# Patient Record
Sex: MALE | Race: WHITE | URBAN - METROPOLITAN AREA
[De-identification: names, ages, dates, MRNs, and addresses within clinical notes are randomized per-mention and may not be internally consistent; named-entity substitution may affect disease eponyms.]

---

## 2020-03-09 ENCOUNTER — OFFICE VISIT (OUTPATIENT)
Dept: FAMILY MEDICINE CLINIC | Age: 35
End: 2020-03-09

## 2020-03-09 VITALS
HEIGHT: 71 IN | WEIGHT: 315 LBS | DIASTOLIC BLOOD PRESSURE: 84 MMHG | HEART RATE: 98 BPM | RESPIRATION RATE: 16 BRPM | BODY MASS INDEX: 44.1 KG/M2 | TEMPERATURE: 98 F | SYSTOLIC BLOOD PRESSURE: 133 MMHG | OXYGEN SATURATION: 98 %

## 2020-03-09 DIAGNOSIS — D68.00 VON WILLEBRAND DISEASE: Primary | ICD-10-CM

## 2020-03-09 DIAGNOSIS — E66.01 OBESITY, MORBID (HCC): ICD-10-CM

## 2020-03-09 DIAGNOSIS — G47.33 OBSTRUCTIVE SLEEP APNEA SYNDROME: ICD-10-CM

## 2020-03-09 NOTE — PROGRESS NOTES
Patient stated name &     Chief Complaint   Patient presents with    Complete Physical     New patient to [facility] Due   Topic    DTaP/Tdap/Td series (1 - Tdap)    Influenza Age 5 to Adult        Wt Readings from Last 3 Encounters:   20 334 lb 9.6 oz (151.8 kg)     Temp Readings from Last 3 Encounters:   20 98 °F (36.7 °C) (Oral)     BP Readings from Last 3 Encounters:   20 133/84     Pulse Readings from Last 3 Encounters:   20 98         Learning Assessment:  :     No flowsheet data found. Depression Screening:  :     3 most recent PHQ Screens 3/9/2020   Little interest or pleasure in doing things Not at all   Feeling down, depressed, irritable, or hopeless Not at all   Total Score PHQ 2 0       Fall Risk Assessment:  :     No flowsheet data found. Abuse Screening:  :     No flowsheet data found. Coordination of Care Questionnaire:  :     1) Have you been to an emergency room, urgent care clinic since your last visit? No    Hospitalized since your last visit? No             2) Have you seen or consulted any other health care providers outside of [address] [place] [address] since your last visit? No  (Include any pap smears or colon screenings in this section.)    Patient is accompanied by self I have received verbal consent from Nicki Charles to discuss any/all medical information while they are present in the room.

## 2020-03-09 NOTE — PATIENT INSTRUCTIONS
STOP the SUGAR The first step in dietary efforts at weight loss is removing as much sugar from the diet as possible. Most dietary sugar is in the forms of table sugar (sucrose), fruit sugar (fructose) and milk sugar (lactose). But, as the picture above demonstrates, you need to watch labels to see if processed foods have added sugars under other names. To eliminate sucrose, eliminate sweet drinks entirely including soft drinks, sports drinks, lemonade, sweet tea and wine. Also, eliminate candy and make desserts a \"special occasion only treat\". Don't eat desserts with every meal or every night. Most fructose is found in fruit and this should be markedly limited. Fruit juice should be avoided. One piece of fruit daily is the limit. Berries are a good choice to eat as a garnish for other foods. Bananas and grapes should be avoided. Avoid high fructose corn syrup (an artificial sweetener). Milk sugar, or lactose, should be avoided as well. Milk is a good source of calcium but not for people struggling with weight issues. Put a little milk in your coffee or tea but otherwise avoid milk. How can you avoid sugar? For starters, don't buy it and don't bring it in the house. It won't tempt you that way! For more information: 
 
Try the internet for videos about sugar addiction or try diet doctor.com. Carb Counting in Diabetes Carbohydrate or carb counting is a method of calculating grams of carbohydrate consumed at meals and snacks. Foods that contain carbohydrate have the greatest effect on blood sugar compared to foods that contain protein or fat. A low carb diet has the greatest likelihood of promoting weight loss. What Foods Have Carbohydrate? Foods that contain carbohydrate or carbs are: 
 
-grains like wheat, rice, oatmeal, and barley 
-grain-based foods like bread, cereal, pasta, and crackers 
-starchy vegetables like potatoes, peas and corn 
-fruit and juice -milk and yogurt 
-dried beans and peas and soy products like veggie burgers 
-sweets and snack foods like sodas, juice drinks, cake, cookies, candy, and chips Non-starchy vegetables like lettuce, cucumbers, broccoli, and cauliflower have very little carbohydrate and minimal impact on your blood glucose. Carbohydrate Servings 
 
-In meal planning, 1 serving of a food with carbohydrate has about 15 grams of carbohydrate: 
-Check serving sizes with measuring cups and spoons or a food scale. -Read the Nutrition Facts on food labels to find out how many grams of carbohydrate are in foods you eat.  
-1 carb serving (15 grams) is roughly equal to one piece of bread How much carbohydrate should I eat? Diabetics are advised to eat: For women-30-45 grams or 2-3 carb servings per meal. 
For men-45-60 grams or 3-4 carb servings per meal. 
 
For weight loss, patients should try to eat under 100 grams of carbs per day. How do I \"manage\" carbs? 
 
-First, eliminate sugar in the form of soft drinks, candy and desserts. Minimize cakes, cookies, smoothies and juices. 
-Next, identify the carbs you are eating. Watch labels and know when you are eating a starch. Eat less bread, noodles, pasta, rice, potatoes, french fries, cereals, chips, crackers and yogurt 
-Eat more healthy proteins and fats with more eggs, full fat dairy products, non starchy vegetables, salads, meat, poultry, fish, cheese, berries, nuts, olive oil and butter. 
-Avoid beer. Europeans refer to beer as \"liquid bread\" and it is made from grains with a high carb content. Where can I find more information? There is a lot of information about carb counting on the internet and there are books about carb counting. Try the American Diabetes Association and Dietdoctor. com So, what can I eat? 1) Protein-protein consumption promotes weight loss.   Eat protein at every meal.  Good sources of protein include meat, fish, poultry and eggs. 2) More soluble fiber-soluble fiber helps us feel \"full\" and helps improve many markers for cardiovascular disease. But, we have to watch out for products with added sugar or large carb servings! Sources of soluble fiber include oatmeal, root vegetables such as sweet potatoes, turnips and carrots, vegetables such as broccoli and Brussel sprouts. 3) Healthy fats and oils-certain fats are better for our blood vessels and cardiovascular system. The oils in fish and seafood tend to be better for us as well as olive oil and the nuts on trees (walnuts, almonds, pistachios and hazelnuts). So, again, try to eat more fish. Use olive oil for cooking and for salad dressings. Nuts can be used in salads and in other dishes and they make a good low carb snack. 4) Non starchy vegetables-Green and yellow vegetables offer a lot of nutrients and very low amounts of carbs that can lead to weight gain. Salads can add a lot to our diet and also pack in a lot of nutrients. Cautions:  avoid starchy vegetables such as potatoes, corn and peas. Also, be careful about what is added to vegetables such as fruit or salad dressings that contain sugar. 5) Full fat dairy products-Cheeses make a good snack or appetizer. Cottage cheese can be a good basis for breakfast.  Yogurt is a good addition to our diet but watch out for yogurt that has added sugar. Avoid milk. EXERCISE AND WEIGHT LOSS You'll hear lots of different things about weight loss and exercise. There is controversy about how much exercise helps with weight loss. We'll review what you should do about exercise and a weight plan here. First, it is hard to lose weight just with exercise. It takes about 3500 extra calories burned to lose a single pound.   To put exercise in perspective, most people burn about 150 calories per mile if they walk or run. Doing the math, it takes over 23 miles to burn up the energy to lose one pound. So if you want to lose weight, exercise by itself is unlikely to help with weight loss very much. You need to work on diet and exercise at the same time to lose weight. And, you need to work on your habits and emotions since they have huge impacts on eating behaviors. But, exercise does help with weight loss. If you exercise, you burn stored fat in your muscles and that allows the levels of insulin in your body to fall. This allows the enzyme that burns fat to \"switch on\" and use stored fat for energy. That combined with a no sugar, lower starch diet combines to promote weight loss. Think of it this way:  exercise permits weight loss! Most people that lose weight and keep it off exercise. What's the right exercise? The best exercise is the one you enjoy and can keep doing! Exercise that makes you feel good physically and makes you feel good about yourself is ideal.   
 
Exercise that elevates your heart rate for a sustained period such as running, biking,  walking and swimming improves your cardiovascular fitness. Resistance exercises such as weight lifting, strength training or calisthenics also clearly improve cardiovascular health and weight control. Stretching and yoga help with flexibility and balance. Ideally, an exercise program should include all of these different types of exercise. How much should I exercise? For weight loss, you should aim to exercise 45 minutes per day, 5-6 days per week. When you exercise 45 minutes, you exhaust the supply of fat your muscles store for energy and you help you body turn on the enzyme that burns stored fat elsewhere. This is the minimum amount of exercise you should shoot for. Remember, you don't need to think of exercise as strictly an organized period of time where that's all you do.   You can increase your exercise in all your daily activities. Walk more at work or school. If you can complete an errand by walking instead of driving, do it. Park farther away from the store if you go shopping and force yourself to walk farther. On breaks at work, walk around the office and greet your coworkers instead of sitting at your desk. Jose Catherineorn a few calories and enjoy the company of others. Go for a walk around the sports field while the kids practice sports. Take another parent with you. What are other benefits of exercise? While exercise helps you lose weight, it is helping you in lots of other ways. Exercise lowers your risk of diabetes and high blood pressure and makes your heart healthier. It lowers your risk of heart attacks. Exercise can help chronic lung disease and congestive heart failure improve. Exercise lowers the risk of dementia including Alzheimer's disease. Exercise lowers the risk of some cancers and decreases the risk of osteoporosis. And interestingly, exercise helps with emotionally health and lowers the risk and severity of emotional illnesses such as depression. Said simply, exercise helps you live longer and better. What will help me keep up the exercise? Remember that exercise is your gift to yourself and your family. So schedule time for your exercise and be selfish about guarding that time. It's yours! Exercise with a friend or friends and make exercise a social activity that you look forward to. Friends keep each other honest and accountable. Think of how you feel when you exercise. Most people just feel better physically and emotionally. Remember that feeling and try to recapture it. Remember exercise will help you live longer and better and will help you be there for your children and grandchildren. Make that commitment for your family.  
 
And don't forget to tell yourself that while you feel better you look better! Bouchra Elmore said it:  \"You look marvelous! \" LOSE WEIGHT WHILE YOU SLEEP Fasting Fasting is part of a weight loss program.  Really?!? Yes. Fasting has been part of the human condition forever. In our modern society, many of us rarely miss a meal but our ancestors often faced prolonged periods of food scarcity so our bodies store calories as fat for that possibility. Fasting, even for a short period, helps us lose weight by burning stored sugar and fat in our liver and \"switching on\" the enzymes that help us burn stored fat. How often should I fast? 
 
You should fast every night! It's important to give our systems a rest from eating and we should fast every night between our evening meal and breakfast.  You should try to have at least 12 hours every night where you aren't eating at all. Longer fasts You can consider longer periods of fasting to lose weight but first a few cautions. Make sure you stay well hydrated. Drink plenty of water. If you take medications for weight loss or diabetes, discuss fasting with your doctor first.     
 
Sleep You can lose weight while you sleep! It makes sense if you think about it. When you sleep, the body's machinery is still running and you aren't taking in any additional calories. And, research shows that good sleeping habits promote weight loss. Sleep too little and weight loss is more difficult. Ideally for weight loss, you should sleep 7-8 hours each night. Interestingly, if you sleep at a cooler temperature, you lose more weight. You body burns more calories to stay warm.

## 2020-03-09 NOTE — PROGRESS NOTES
Assessment and Plan    1. Obesity, morbid ([facility] [address]  Diet as discussed. No sugar, low starch with 12 hours overnight fast, 45 min of exercise daily  - CBC WITH AUTOMATED DIFF; Future  - HEMOGLOBIN A1C WITH EAG; Future  - METABOLIC PANEL, BASIC; Future  - TSH 3RD GENERATION; Future    2. Von Willebrand disease ([facility] [address]  To Dr. Laura Cooper    3. Obstructive sleep apnea syndrome  For sleep study with Dr. Remedios Laughlin    4. Tennis elbow, left   Ice and splint as discussed. Follow-up and Dispositions    · Return in about 1 month (around 4/9/2020). Diagnosis and plan discussed with patient who verbillized understanding. History of present Ramy Maria is a 30 y.o. male presenting for Complete Physical (New patient to us)    Girlfriend and mom think he has sleep apnea  Snores  No energy problems during the day. Neck is a size 20-21  BMI 46  Nondrinker    ? Michelle Scruggs  Previously told he had von Willdebrand's disease and hemophilia. Wants referral to heme onc. Review of Systems   Musculoskeletal: Positive for joint pain (left elbow). Past Medical History:   Diagnosis Date    Clotting disorder [facility])      History reviewed. No pertinent surgical history.   Family History   Problem Relation Age of Onset    Elevated Lipids Mother     Hypertension Mother    [facility] Elevated Lipids Father     Heart Disease Father      Social History     Socioeconomic History    Marital status: SINGLE     Spouse name: Not on file    Number of children: Not on file    Years of education: Not on file    Highest education level: Not on file   Occupational History    Not on file   Social Needs    Financial resource strain: Not on file    Food insecurity:     Worry: Not on file     Inability: Not on file    Transportation needs:     Medical: Not on file     Non-medical: Not on file   Tobacco Use    Smoking status: Never Smoker    Smokeless tobacco: Never Used Substance and Sexual Activity    Alcohol use: Not Currently     Frequency: Never    Drug use: Never    Sexual activity: Not Currently   Lifestyle    Physical activity:     Days per week: Not on file     Minutes per session: Not on file    Stress: Not on file   Relationships    Social connections:     Talks on phone: Not on file     Gets together: Not on file     Attends Yarsani service: Not on file     Active member of club or organization: Not on file     Attends meetings of clubs or organizations: Not on file     Relationship status: Not on file    Intimate partner violence:     Fear of current or ex partner: Not on file     Emotionally abused: Not on file     Physically abused: Not on file     Forced sexual activity: Not on file   Other Topics Concern    Not on file   Social History Narrative    Not on file         Prior to Admission medications    Not on File        Allergies   Allergen Reactions    Gluten Diarrhea     Also, intestinal cramps    Morphine Itching       Vitals:    03/09/20 1424   BP: 133/84   Pulse: 98   Resp: 16   Temp: 98 °F (36.7 °C)   TempSrc: Oral   SpO2: 98%   Weight: 334 lb 9.6 oz (151.8 kg)   Height: 5' 11\" (1.803 m)     Body mass index is 46.67 kg/m². Objective  Physical Exam  Vitals signs and nursing note reviewed. Constitutional:       Appearance: Normal appearance. He is not toxic-appearing. HENT:      Head: Normocephalic and atraumatic. Neck:      Musculoskeletal: No muscular tenderness. Cardiovascular:      Rate and Rhythm: Normal rate and regular rhythm. Heart sounds: Normal heart sounds. No murmur. No gallop. Pulmonary:      Effort: Pulmonary effort is normal. No respiratory distress. Breath sounds: Normal breath sounds. No wheezing, rhonchi or rales. Lymphadenopathy:      Cervical: No cervical adenopathy. Neurological:      Mental Status: He is alert.    Psychiatric:         Mood and Affect: Mood normal.         Behavior: Behavior normal.         Thought Content:  Thought content normal.         Judgment: Judgment normal.       Pain left lat epicondyle.  + \"Book\" test.

## 2020-03-10 LAB
BASOPHILS # BLD AUTO: 0.1 X10E3/UL (ref 0–0.2)
BASOPHILS NFR BLD AUTO: 1 %
BUN SERPL-MCNC: 10 MG/DL (ref 6–20)
BUN/CREAT SERPL: 12 (ref 9–20)
CALCIUM SERPL-MCNC: 9.7 MG/DL (ref 8.7–10.2)
CHLORIDE SERPL-SCNC: 102 MMOL/L (ref 96–106)
CO2 SERPL-SCNC: 22 MMOL/L (ref 20–29)
CREAT SERPL-MCNC: 0.82 MG/DL (ref 0.76–1.27)
EOSINOPHIL # BLD AUTO: 0.1 X10E3/UL (ref 0–0.4)
EOSINOPHIL NFR BLD AUTO: 1 %
ERYTHROCYTE [DISTWIDTH] IN BLOOD BY AUTOMATED COUNT: 13.9 % (ref 11.6–15.4)
EST. AVERAGE GLUCOSE BLD GHB EST-MCNC: 117 MG/DL
GLUCOSE SERPL-MCNC: 80 MG/DL (ref 65–99)
HBA1C MFR BLD: 5.7 % (ref 4.8–5.6)
HCT VFR BLD AUTO: 47.4 % (ref 37.5–51)
HGB BLD-MCNC: 15.7 G/DL (ref 13–17.7)
IMM GRANULOCYTES # BLD AUTO: 0 X10E3/UL (ref 0–0.1)
IMM GRANULOCYTES NFR BLD AUTO: 0 %
LYMPHOCYTES # BLD AUTO: 3.3 X10E3/UL (ref 0.7–3.1)
LYMPHOCYTES NFR BLD AUTO: 42 %
MCH RBC QN AUTO: 27.7 PG (ref 26.6–33)
MCHC RBC AUTO-ENTMCNC: 33.1 G/DL (ref 31.5–35.7)
MCV RBC AUTO: 84 FL (ref 79–97)
MONOCYTES # BLD AUTO: 0.8 X10E3/UL (ref 0.1–0.9)
MONOCYTES NFR BLD AUTO: 10 %
NEUTROPHILS # BLD AUTO: 3.7 X10E3/UL (ref 1.4–7)
NEUTROPHILS NFR BLD AUTO: 46 %
PLATELET # BLD AUTO: 275 X10E3/UL (ref 150–450)
POTASSIUM SERPL-SCNC: 4.1 MMOL/L (ref 3.5–5.2)
RBC # BLD AUTO: 5.66 X10E6/UL (ref 4.14–5.8)
SODIUM SERPL-SCNC: 143 MMOL/L (ref 134–144)
TSH SERPL DL<=0.005 MIU/L-ACNC: 2.78 UIU/ML (ref 0.45–4.5)
WBC # BLD AUTO: 7.9 X10E3/UL (ref 3.4–10.8)

## 2020-04-13 ENCOUNTER — VIRTUAL VISIT (OUTPATIENT)
Dept: FAMILY MEDICINE CLINIC | Age: 35
End: 2020-04-13

## 2020-04-13 VITALS — WEIGHT: 315 LBS | HEIGHT: 71 IN | BODY MASS INDEX: 44.1 KG/M2

## 2020-04-13 DIAGNOSIS — E66.01 OBESITY, MORBID (HCC): ICD-10-CM

## 2020-04-13 DIAGNOSIS — G47.33 OBSTRUCTIVE SLEEP APNEA SYNDROME: Primary | ICD-10-CM

## 2020-04-13 RX ORDER — CETIRIZINE HYDROCHLORIDE 10 MG/1
10 CAPSULE, LIQUID FILLED ORAL ONCE
COMMUNITY

## 2020-04-13 NOTE — PROGRESS NOTES
Consent: Gela Sena, who was seen by synchronous (real-time) audio-video technology, and/or his healthcare decision maker, is aware that this patient-initiated, Telehealth encounter on 4/13/2020 is a billable service, with coverage as determined by his insurance carrier. He is aware that he may receive a bill and has provided verbal consent to proceed: Yes. Assessment & Plan:   Diagnoses and all orders for this visit:    1. Obstructive sleep apnea syndrome  -     SLEEP MEDICINE REFERRAL  Re submitted referral for Dr. Carlito Herrera. Consider wedge pillow    2. Obesity, morbid ([address]  Discussed home exercise  Diet'  Self monitoring. Doing well on his own. Asks about NOOM and HCG shots. Follow-up and Dispositions    · Return in about 1 month (around 5/13/2020) for Review test results. I spent at least 15 minutes with this established patient, and >50% of the time was spent counseling and/or coordinating care regarding sleep apnea, weight, fatty liver,clotting disorder  712  Subjective:   Gela Sena is a 30 y.o. male who was seen for Weight Management      Prior to Admission medications    Medication Sig Start Date End Date Taking? Authorizing Provider   Cetirizine (ZyrTEC) 10 mg cap Take 10 mg by mouth once. Yes Provider, Historical     Allergies   Allergen Reactions    Gluten Diarrhea     Also, intestinal cramps    Morphine Itching     He has lost 10 pounds  Working on diet  Walking but no other exercise due to COVID    Never heard back about Sleep study    Wants to check LFT\"s which were up in past    Still pending appointment with HEMATOLOGY. Moved back to [place] due to COVID and working from home      Review of Systems   Constitutional: Positive for weight loss. Psychiatric/Behavioral: Negative.             Objective:   Vital Signs: (As obtained by patient/caregiver at home)  Visit Vitals  Ht 5' 11\" (1.803 m)   Wt 324 lb (147 kg)   BMI 45.19 kg/m²        [INSTRUCTIONS:  \"[x]\" Indicates a positive item  \"[]\" Indicates a negative item  -- DELETE ALL ITEMS NOT EXAMINED]    Constitutional: [x] Appears well-developed and well-nourished [x] No apparent distress      [] Abnormal -     Mental status: [x] Alert and awake  [x] Oriented to person/place/time [x] Able to follow commands    [] Abnormal -     Eyes:   EOM    [x]  Normal    [] Abnormal -   Sclera  [x]  Normal    [] Abnormal -          Discharge [x]  None visible   [] Abnormal -     HENT: [x] Normocephalic, atraumatic  [] Abnormal -   [x] Mouth/Throat: Mucous membranes are moist    External Ears [x] Normal  [] Abnormal -    Neck: [x] No visualized mass [] Abnormal -     Pulmonary/Chest: [x] Respiratory effort normal   [x] No visualized signs of difficulty breathing or respiratory distress        [] Abnormal -      Musculoskeletal:   [x] Normal gait with no signs of ataxia         [x] Normal range of motion of neck        [] Abnormal -     Neurological:        [x] No Facial Asymmetry (Cranial nerve 7 motor function) (limited exam due to video visit)          [x] No gaze palsy        [] Abnormal -          Skin:        [x] No significant exanthematous lesions or discoloration noted on facial skin         [] Abnormal -            Psychiatric:       [x] Normal Affect [] Abnormal -        [x] No Hallucinations    Other pertinent observable physical exam findings:-        We discussed the expected course, resolution and complications of the diagnosis(es) in detail. Medication risks, benefits, costs, interactions, and alternatives were discussed as indicated. I advised him to contact the office if his condition worsens, changes or fails to improve as anticipated. He expressed understanding with the diagnosis(es) and plan. Bruce Snow is a 30 y.o. male being evaluated by a video visit encounter for concerns as above. A caregiver was present when appropriate.  Due to this being a TeleHealth encounter ([facility]), evaluation of the following organ systems was limited: Vitals/Constitutional/EENT/Resp/CV/GI//MS/Neuro/Skin/Heme-Lymph-Imm. Pursuant to the emergency declaration under the [facility], [facility] waiver authority and the [employer] Act, this Virtual  Visit was conducted, with patient's (and/or legal guardian's) consent, to reduce the patient's risk of exposure to COVID-19 and provide necessary medical care. Services were provided through a video synchronous discussion virtually to substitute for in-person clinic visit. Patient at home and I was in office.       Alfa Lee MD

## 2020-04-13 NOTE — PROGRESS NOTES
Teresita Koch is a 30 y.o. male      Chief Complaint   Patient presents with    Weight Management         1. Have you been to the ER, urgent care clinic since your last visit? Hospitalized since your last visit? no      2. Have you seen or consulted any other health care providers outside of the [address] [place] [address] since your last visit? Include any pap smears or colon screening.  no

## 2020-04-20 ENCOUNTER — TELEPHONE (OUTPATIENT)
Dept: ONCOLOGY | Age: 35
End: 2020-04-20

## 2020-04-20 NOTE — PROGRESS NOTES
[address] [facility] at [facility]  [phone]    Hematology / Oncology Consult    Reason for Visit:   Bernardo Gonzalez is a 30 y.o. male who is seen in consultation at the request of Dr. Benny Cartwright for evaluation of vWD. History of Present Illness:   Bernardo Gonzalez is a 30 y.o. male with ADD, fatty liver, who comes in for evaluation of von Willebrand Disease (vWD.) He was diagnosed with vWD in 2010 by a Hematologist at [facility] after post-op bleed. He was later followed by Dr. Gisselle Loving of [facility]. Pt had an episode of excessive bleeding in Nov 2010 after ACL repair, which responded to DDAVP. Desmopressin challenge on 1/28/11 showed excellent response. This was around the time he had wisdom tooth removal [facility]. He is unsure, but he thinks he used Stimate prior to the procedure and used an oral anticoagulant rinse (at half dose), which resulted in a few cups of bleeding. Repeating oral rinse helped. Back in 1994, he underwent tonsillectomy prior to vWD diagnosis. He was in 3rd grade and remembers being held in the hospital for bleeding afterwards for 12-13 hrs. He was advised to use nasal Stimate for major surgeries or injuries. May need Humate P or Alphanate for von Willebrand Factor replacement. He used to have epistaxis as a child, but this improved after cauterization. He has occasional nosebleeds now during dry weather. No melena/hematochezia. No gum bleeding. No prior blood transfusions, but did receive plasma after the ACL repair. Pt has h/o mild transaminitis attributed to fatty liver seen on ultrasound on 3/30/11. Pt states that he was given a lot of Tylenol post-op and that his liver enzymes. Pt states that in his late teens and early 25s (while in college), he endorses heavy EtOH use with daily drinking to get drunk, often 8-9 yrs ([id number]). After the ACL repair, he cut down on EtOH significantly.  He was working on weight loss, avoiding EtOH and Tylenol. Now only drinking once a week or less. He also states some sort of lipidemia runs in his family. He states his liver enzymes were normal in 2018. No bleeding episodes. He endorses easy bruising but none today and none on torso. Past Medical History:   Diagnosis Date    Clotting disorder ([address]       No past surgical history on file. ACL surgery in 2010, tonsillectomy in 1994. Social History     Tobacco Use    Smoking status: Never Smoker    Smokeless tobacco: Never Used   Substance Use Topics    Alcohol use: Not Currently     Frequency: Never      Family History   Problem Relation Age of Onset    Elevated Lipids Mother     Hypertension Mother     Elevated Lipids Father     Heart Disease Father    Mother: h/o DVT and antithrombin III defic. Maternal GM: Uterine cancer, CVA age 62, Essential thrombocytosis  Maternal uncle: stomach and lung cancer  Cousin: Bone cancer    Current Outpatient Medications   Medication Sig    Cetirizine (ZyrTEC) 10 mg cap Take 10 mg by mouth once. No current facility-administered medications for this visit. Allergies   Allergen Reactions    Gluten Diarrhea     Also, intestinal cramps    Morphine Itching        Review of Systems: A complete review of systems was obtained, negative except as described above.     Physical Exam:     Visit Vitals  /84 (BP 1 Location: Left arm, BP Patient Position: Sitting)   Pulse (!) 111   Temp 97.8 °F (36.6 °C) (Oral)   Ht 5' 11\" (1.803 m)   Wt 325 lb 12.8 oz (147.8 kg)   SpO2 96%   BMI 45.44 kg/m²     ECOG PS: 0  General: Well developed, no acute distress  Eyes: PERRLA, EOMI, anicteric sclerae  HENT: Atraumatic, OP clear, TMs intact without erythema  Neck: Supple  Lymphatic: No cervical, supraclavicular, axillary or inguinal adenopathy  Respiratory: CTAB, normal respiratory effort  CV: Normal rate, regular rhythm, no murmurs, no peripheral edema  GI: Soft, nontender, nondistended, no masses, no hepatomegaly, no splenomegaly  MS: Normal gait and station. Digits without clubbing or cyanosis. Skin: No rashes, ecchymoses, or petechiae. Normal temperature, turgor, and texture. Neuro/Psych: Alert, oriented. 5/5 strength in all 4 extremities. Appropriate affect, normal judgment/insight. Results:     Lab Results   Component Value Date/Time    WBC 7.9 2020 03:24 PM    HGB 15.7 2020 03:24 PM    HCT 47.4 2020 03:24 PM    PLATELET 991  03:24 PM    MCV 84 2020 03:24 PM    ABS. NEUTROPHILS 3.7 2020 03:24 PM     Lab Results   Component Value Date/Time    Sodium 143 2020 03:24 PM    Potassium 4.1 2020 03:24 PM    Chloride 102 2020 03:24 PM    CO2 22 2020 03:24 PM    Glucose 80 2020 03:24 PM    BUN 10 2020 03:24 PM    Creatinine 0.82 2020 03:24 PM    GFR est  2020 03:24 PM    GFR est non- 2020 03:24 PM    Calcium 9.7 2020 03:24 PM     No results found for: TBILI, ALT, SGOT, AP, TP, ALB, GLOB  No results found for: IRON, FE, TIBC, IBCT, PSAT, FERR    No results found for: B12LT, FOL, RBCF  Lab Results   Component Value Date/Time    TSH 2.780 2020 03:24 PM     No results found for: HAMAT, HAAB, HABT, HAAT, HBSAG, HBSB, HBSAT, HBABN, HBCM, HBCAB, HBCAT, XBCABS, [address] [facility], [address] [place] [address], [facility], [id number], [address] [facility], [address] [place] [address], [address] [place] [address], [id number], [id number], [address] [place] [address], [id number], HBCMLT, [id number], HCGAT      Imaging:     Radiology report(s) reviewed. Assessment & Plan:   Gela Sena is a 30 y.o. male     1. Von Willebrand Disease: I do not have any of the vWD labs that led to his diagnosis, but based on review of notes from prior hematologist, patient did respond to IV DDAVP in the past as well as desmopressin challenge. He states that his current Stimate nasal spray is . No upcoming surgical procedures scheduled and no bleeding problems currently. -- Check vWD panel and maybe coags - will call pt with results.   -- Return as needed. 2. Steatohepatitis: May be 2/2 EtOH abuse as a young adult and obesity later on. Pt is now working on weight loss and moderation with EtOH. -- Agree with weight loss and minimal use of EtOH.    3. Obesity: Had a long discussion on management. BMI 45.4. Discussed calorie counting, diet and exercise along with CDC recommendations on combined aerobic activity with weight training. Pt has already implemented a walking program.     I appreciate the opportunity to participate in Mr. Rae Blackwell nelda.     Signed By: Ilia Ballard MD     April 21, 2020

## 2020-04-20 NOTE — TELEPHONE ENCOUNTER
[facility] [place]  [phone]      04/20/20 1:16 PM Spoke with patient. He states he was initially diagnosed with von Willebrand's disease in 2010. Patient states he had labs checked last year by a different provider who advised that patient's factor deficiency was more indicative of hemophilia rather than von Willebrand's disease. Patient interested in having labs redrawn. Offered to keep appointment as scheduled for tomorrow, but as an in-person visit, or can reschedule patient for 1-2 weeks from now. Patient agreeable to visit tomorrow. He is also requesting if Dr. Gianluca Jane can check his liver enzymes. Advised this nurse would forward above to Dr. Gianluca Jane. No further questions or concerns at this time.

## 2020-04-21 ENCOUNTER — HOSPITAL ENCOUNTER (OUTPATIENT)
Dept: LAB | Age: 35
Discharge: HOME | End: 2020-04-21

## 2020-04-21 ENCOUNTER — OFFICE VISIT (OUTPATIENT)
Dept: ONCOLOGY | Age: 35
End: 2020-04-21

## 2020-04-21 VITALS
SYSTOLIC BLOOD PRESSURE: 129 MMHG | WEIGHT: 315 LBS | TEMPERATURE: 97.8 F | HEART RATE: 111 BPM | HEIGHT: 71 IN | DIASTOLIC BLOOD PRESSURE: 84 MMHG | BODY MASS INDEX: 44.1 KG/M2 | OXYGEN SATURATION: 96 %

## 2020-04-21 DIAGNOSIS — E66.01 CLASS 3 SEVERE OBESITY DUE TO EXCESS CALORIES WITHOUT SERIOUS COMORBIDITY WITH BODY MASS INDEX (BMI) OF 45.0 TO 49.9 IN ADULT (HCC): ICD-10-CM

## 2020-04-21 DIAGNOSIS — D68.00 VON WILLEBRAND DISEASE: Primary | ICD-10-CM

## 2020-04-21 DIAGNOSIS — D68.00 VON WILLEBRAND DISEASE: ICD-10-CM

## 2020-04-21 DIAGNOSIS — K75.81 STEATOHEPATITIS: ICD-10-CM

## 2020-04-21 LAB
ALBUMIN SERPL-MCNC: 4.4 G/DL (ref 3.5–5)
ALBUMIN/GLOB SERPL: 1.3 {RATIO} (ref 1.1–2.2)
ALP SERPL-CCNC: 63 U/L (ref 45–117)
ALT SERPL-CCNC: 110 U/L (ref 12–78)
APTT PPP: 36.5 SEC (ref 22.1–32)
AST SERPL-CCNC: 42 U/L (ref 15–37)
BILIRUB DIRECT SERPL-MCNC: 0.2 MG/DL (ref 0–0.2)
BILIRUB SERPL-MCNC: 0.8 MG/DL (ref 0.2–1)
COMMENT, HOLDF: NORMAL
GLOBULIN SER CALC-MCNC: 3.3 G/DL (ref 2–4)
INR PPP: 1 (ref 0.9–1.1)
PROT SERPL-MCNC: 7.7 G/DL (ref 6.4–8.2)
PROTHROMBIN TIME: 10.5 SEC (ref 9–11.1)
SAMPLES BEING HELD,HOLD: NORMAL
THERAPEUTIC RANGE,PTTT: ABNORMAL SECS (ref 58–77)

## 2020-04-21 NOTE — PROGRESS NOTES
Esperanza Harsh is a 30 y.o. male here for evaluation of von Willebrand's disease. Patient with no complaints of pain at this time.

## 2020-04-22 LAB
FACT VIII ACT/NOR PPP: 48 % (ref 56–140)
INTERPRETATION, 910378, CSIR1: ABNORMAL
VWF AG ACT/NOR PPP IA: 83 % (ref 50–200)
VWF:RCO ACT/NOR PPP PL AGG: 61 % (ref 50–200)

## 2020-04-22 NOTE — PROGRESS NOTES
Your labs are currently not consistent with von Willebrand disease because the von Willebrand factor antigen and activity are both in normal range. Your factor VIII level is mildly decreased at 48% [normal range is %.] But this mild level of decrease is not consistent with Hemophilia either where mild Hemophilia is characterized by Factor VIII activity < 30%. Your AST and ALT are mildly elevated at 42 and 110. You can discuss these levels with your PCP, but I would attribute these mildly elevated levels to fatty liver as we discussed. You are already working on management for this. No need to recheck Baring Gift or Factor VIII levels again unless you are scheduled for a surgery or invasive procedure in the future.

## 2020-04-22 NOTE — PROGRESS NOTES
Called patient. Informed him, per Dr. Bobby Peraza, of his lab results--\"Your labs are currently not consistent with von Willebrand disease because the von Willebrand factor antigen and activity are both in normal range. Your factor VIII level is mildly decreased at 48% [normal range is %.] But this mild level of decrease is not consistent with Hemophilia either where mild Hemophilia is characterized by Factor VIII activity < 30%. Your AST and ALT are mildly elevated at 42 and 110. You can discuss these levels with your PCP, but I would attribute these mildly elevated levels to fatty liver as we discussed. You are already working on management for this. No need to recheck [place] Kaska or Factor VIII levels again unless you are scheduled for a surgery or invasive procedure in the future. \" Patient verbalized understanding of above. He inquired what might have caused his history of \"free bleeding\" in the past and if any further work-up was required to investigate this. Advised this  nurse would defer to Dr. Bobby Peraza and call patient back with clarification. He verbalized understanding.

## 2020-04-23 NOTE — PROGRESS NOTES
Called patient. Explained, per Dr. Medardo Reis, that patient may have had a mild type of von Willebrand Disease in the past. However, von Willebrand activity can increase with age so patient may have had it in the past but not now. Patient verbalized understanding of above. Patient with additional questions--patient inquiring if the factor VIII level can fluctuate. Patient expressed concern that his Factor VIII levels are below average, though he states he understands that they are not low enough to support diagnosis of Hemophilia. Advised this nurse would forward his questions to Dr. Medardo Reis. Due to complexity and number of questions, inquired if patient would be available for telemedicine visit if needed. Patient agreeable. Will defer to Dr. Medardo Reis to verify if telemedicine visit is appropriate.

## 2020-04-27 ENCOUNTER — VIRTUAL VISIT (OUTPATIENT)
Dept: ONCOLOGY | Age: 35
End: 2020-04-27

## 2020-04-27 DIAGNOSIS — D68.00 VON WILLEBRAND DISEASE: Primary | ICD-10-CM

## 2020-04-27 DIAGNOSIS — E66.01 CLASS 3 SEVERE OBESITY DUE TO EXCESS CALORIES WITHOUT SERIOUS COMORBIDITY WITH BODY MASS INDEX (BMI) OF 45.0 TO 49.9 IN ADULT (HCC): ICD-10-CM

## 2020-04-27 DIAGNOSIS — K75.81 STEATOHEPATITIS: ICD-10-CM

## 2020-04-27 NOTE — PROGRESS NOTES
[address] [facility] Oncology at [facility]  [phone]    Hematology / Oncology Established Visit    Reason for Visit:   Esperanza House is a 30 y.o. male who is seen by synchronous (real-time) audio-video technology on 4/27/2020 for follow up of von Willebrand Disease. History of Present Illness:   Esperanza House is a 30 y.o. male with ADD, fatty liver, is seen for evaluation of von Willebrand Disease (vWD.) He was diagnosed with vWD in 2010 by a Hematologist at [facility] after post-op bleed. He was later followed by Dr. Yanira Thompson of [facility]. Pt had an episode of excessive bleeding in Nov 2010 after ACL repair, which responded to DDAVP. Desmopressin challenge on 1/28/11 showed excellent response. This was around the time he had wisdom tooth removal [facility]. He is unsure, but he thinks he used Stimate prior to the procedure and used an oral anticoagulant rinse (at half dose), which resulted in a few cups of bleeding. Repeating oral rinse helped. Back in 1994, he underwent tonsillectomy prior to vWD diagnosis. He was in 3rd grade and remembers being held in the hospital for bleeding afterwards for 12-13 hrs. He was advised to use nasal Stimate for major surgeries or injuries. May need Humate P or Alphanate for von Willebrand Factor replacement. He used to have epistaxis as a child, but this improved after cauterization. He has occasional nosebleeds now during dry weather. No melena/hematochezia. No gum bleeding. No prior blood transfusions, but did receive plasma after the ACL repair. Pt has h/o mild transaminitis attributed to fatty liver seen on ultrasound on 3/30/11. Pt states that he was given a lot of Tylenol post-op and that his liver enzymes. Pt states that in his late teens and early 25s (while in college), he endorses heavy EtOH use with daily drinking to get drunk, often 8-9 yrs ([id number]). After the ACL repair, he cut down on EtOH significantly.  He was working on weight loss, avoiding EtOH and Tylenol. Now only drinking once a week or less. He also states some sort of lipidemia runs in his family. He states his liver enzymes were normal in 2018. No bleeding episodes. He endorses easy bruising but none today and none on torso. Interval History:  Pt is doing well and remains asymptomatic. He is seen for follow up of recent lab work. He has several questions about why his Factor VIII activity is slightly low. He confirms his blood type is O positive. Past Medical History:   Diagnosis Date    Clotting disorder [facility])       Past Surgical History:   Procedure Laterality Date    HX TONSILLECTOMY      HX WISDOM TEETH EXTRACTION      ACL surgery in , tonsillectomy in . Social History     Tobacco Use    Smoking status: Former Smoker     Last attempt to quit: 10/2006     Years since quittin.5    Smokeless tobacco: Never Used   Substance Use Topics    Alcohol use: Yes     Frequency: Never     Comment: less than one per week      Family History   Problem Relation Age of Onset    Elevated Lipids Mother     Hypertension Mother     Elevated Lipids Father     Heart Disease Father     Diabetes Maternal Aunt     Diabetes Maternal Uncle    Mother: h/o DVT and antithrombin III defic. Maternal GM: Uterine cancer, CVA age 62, Essential thrombocytosis  Maternal uncle: stomach and lung cancer  Cousin: Bone cancer    Current Outpatient Medications   Medication Sig    Cetirizine (ZyrTEC) 10 mg cap Take 10 mg by mouth once. No current facility-administered medications for this visit. Allergies   Allergen Reactions    Gluten Diarrhea     Also, intestinal cramps    Morphine Itching        Review of Systems: A complete review of systems was obtained, negative except as described above. Physical Exam:       Due to this being a TeleHealth evaluation, many elements of the physical examination are unable to be assessed.      Constitutional: Appears well-developed and well-nourished in no apparent distress   Mental status: Alert and awake, Oriented to person/place/time, Able to follow commands  Eyes: EOM normal, Sclera normal, No visible ocular discharge  HENT: Normocephalic, atraumatic; Mouth/Throat: Moist mucous membranes, External Ears normal  Neck: No visualized mass  Pulmonary/Chest: Respiratory effort normal, No visualized signs of difficulty breathing or respiratory distress   Musculoskeletal: Normal gait with no signs of ataxia, Normal range of motion of neck  Neurological: No facial asymmetry (Cranial nerve 7 motor function), No gaze palsy  Skin: No significant exanthematous lesions or discoloration noted on facial skin  Psychiatric: Normal affect, normal judgment/insight. No hallucinations       Results:     Lab Results   Component Value Date/Time    WBC 7.9 03/09/2020 03:24 PM    HGB 15.7 03/09/2020 03:24 PM    HCT 47.4 03/09/2020 03:24 PM    PLATELET 899 73/66/8477 03:24 PM    MCV 84 03/09/2020 03:24 PM    ABS. NEUTROPHILS 3.7 03/09/2020 03:24 PM     Lab Results   Component Value Date/Time    Sodium 143 03/09/2020 03:24 PM    Potassium 4.1 03/09/2020 03:24 PM    Chloride 102 03/09/2020 03:24 PM    CO2 22 03/09/2020 03:24 PM    Glucose 80 03/09/2020 03:24 PM    BUN 10 03/09/2020 03:24 PM    Creatinine 0.82 03/09/2020 03:24 PM    GFR est  03/09/2020 03:24 PM    GFR est non- 03/09/2020 03:24 PM    Calcium 9.7 03/09/2020 03:24 PM     Lab Results   Component Value Date/Time    Bilirubin, total 0.8 04/21/2020 09:04 AM    ALT (SGPT) 110 (H) 04/21/2020 09:04 AM    AST (SGOT) 42 (H) 04/21/2020 09:04 AM    Alk.  phosphatase 63 04/21/2020 09:04 AM    Protein, total 7.7 04/21/2020 09:04 AM    Albumin 4.4 04/21/2020 09:04 AM    Globulin 3.3 04/21/2020 09:04 AM     No results found for: IRON, FE, TIBC, IBCT, PSAT, FERR    No results found for: B12LT, FOL, RBCF  Lab Results   Component Value Date/Time    TSH 2.780 03/09/2020 03:24 PM     No results found for: HAMAT, HAAB, HABT, HAAT, HBSAG, HBSB, HBSAT, HBABN, HBCM, HBCAB, HBCAT, XBCABS, HBEAB, HBEAG, XHEPCS, [id number], HBEGLT, HBCMLT, HBCLT, HBEBLT, [id number], [id number], HAVMLT, [id number], HBCMLT, [id number], HCGAT    Labs 20:   Factor VIII Activity: 48% L  VWF Antigen: 83  VWF Activity: 61    The VWF:Ag is normal. The VWF:RCo is normal. The FVIII is   slightly decreased. VON WILLEBRAND FACTOR ASSESSMENT CURRENT RESULTS   INTERPRETATION   -   Factor VIII is reduced compared to VWF levels and for this   reason, hemophilia A (carrier in a female), type 2N VWD or a   spurious decrease in FVIII as may occur in the presence of   certain lupus anticoagulants or poor specimen handling   should be considered. VON WILLEBRAND FACTOR ASSESSMENT   -   VWF and FVIII levels vary with ABO blood group with the   lowest levels occurring in patients with type O. The lower   limit of the reference interval in persons with type O is   approximately 40%. Imaging:     Radiology report(s) reviewed. Assessment & Plan:   Cyril Nicole is a 30 y.o. male     1. Von Willebrand Disease: I do not have any of the vWD labs that led to his diagnosis, but based on review of notes from prior hematologist, patient did respond to IV DDAVP in the past as well as desmopressin challenge. He states that his current Stimate nasal spray is . No upcoming surgical procedures scheduled and no bleeding problems currently. I discussed that I would be happy to refill the Stimate nasal spray for patient to have on hand if he wishes. Current labs 2020 are not consistent with von Willebrand disease, but do show slightly decreased FVIII activity - this could be due to his O blood type, type 2N vWD or spurious decrease in FVIII. In the future, could consider repeating vWD panel along with type 2N binding assay as needed. Could also check PTT mixing study and lupus anticoagulant as needed. -- Pt to request records from [facility]. -- Return as needed. 2. Steatohepatitis: May be 2/2 EtOH abuse as a young adult and obesity later on. Pt is now working on weight loss and moderation with EtOH. -- Agree with weight loss and minimal use of EtOH.    3. Obesity: Had a long discussion on management. BMI 45.4. Discussed calorie counting, diet and exercise along with CDC recommendations on combined aerobic activity with weight training. Pt has already implemented a walking program.     I appreciate the opportunity to participate in Mr. Cammie Nice care. Total physician time spent on this encounter was 40 minutes. I was in the office while conducting this encounter. Consent:  He and/or his healthcare decision maker is aware that this patient-initiated Telehealth encounter is a billable service, with coverage as determined by his insurance carrier. He is aware that he may receive a bill and has provided verbal consent to proceed: Yes    Pursuant to the emergency declaration under the [address] and the [facility], 1135 waiver authority and the [employer] Act, this Virtual  Visit was conducted, with patient's consent, to reduce the patient's risk of exposure to COVID-19 and provide continuity of care for an established patient. Services were provided through a video synchronous discussion virtually to substitute for in-person clinic visit. This visit was completed virtually using [employer] me.         Signed By: Vinayak Eden MD     April 27, 2020

## 2020-04-27 NOTE — PROGRESS NOTES
Chacha Swann is a 30 y.o. male here for follow up of von Willebrand disease. Patient with complaints of muscular pain in back, rates as a 1 out of 10.

## 2020-04-29 LAB — VWF MULTIMERS PPP IB: NORMAL

## 2020-05-18 ENCOUNTER — TELEPHONE (OUTPATIENT)
Dept: FAMILY MEDICINE CLINIC | Age: 35
End: 2020-05-18

## 2020-05-18 DIAGNOSIS — K76.0 NAFLD (NONALCOHOLIC FATTY LIVER DISEASE): Primary | ICD-10-CM

## 2020-05-18 NOTE — TELEPHONE ENCOUNTER
Pt called stating he needs referral to [facility] hepatology for his fatty liver

## 2020-05-21 ENCOUNTER — VIRTUAL VISIT (OUTPATIENT)
Dept: FAMILY MEDICINE CLINIC | Age: 35
End: 2020-05-21

## 2020-05-21 VITALS — WEIGHT: 315 LBS | BODY MASS INDEX: 44.1 KG/M2 | HEIGHT: 71 IN

## 2020-05-21 DIAGNOSIS — M54.50 ACUTE MIDLINE LOW BACK PAIN WITHOUT SCIATICA: Primary | ICD-10-CM

## 2020-05-21 DIAGNOSIS — K76.0 NAFLD (NONALCOHOLIC FATTY LIVER DISEASE): ICD-10-CM
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
## 2022-03-19 PROBLEM — E66.01 OBESITY, MORBID (HCC): Status: ACTIVE | Noted: 2020-03-09

## 2022-06-07 ENCOUNTER — OFFICE VISIT (OUTPATIENT)
Dept: FAMILY MEDICINE CLINIC | Age: 37
End: 2022-06-07
Payer: COMMERCIAL

## 2022-06-07 VITALS
HEART RATE: 78 BPM | SYSTOLIC BLOOD PRESSURE: 145 MMHG | DIASTOLIC BLOOD PRESSURE: 88 MMHG | OXYGEN SATURATION: 98 % | TEMPERATURE: 98.2 F | HEIGHT: 71 IN | BODY MASS INDEX: 44.1 KG/M2 | WEIGHT: 315 LBS | RESPIRATION RATE: 18 BRPM

## 2022-06-07 DIAGNOSIS — E66.01 OBESITY, MORBID (HCC): ICD-10-CM

## 2022-06-07 DIAGNOSIS — Z00.00 ANNUAL PHYSICAL EXAM: Primary | ICD-10-CM

## 2022-06-07 PROCEDURE — 99395 PREV VISIT EST AGE 18-39: CPT | Performed by: FAMILY MEDICINE

## 2022-06-07 NOTE — PROGRESS NOTES
Gabriel Up  31 y.o. male  1985  CGT:[id number]  [facility] FAM[facility]IC[facility]  Progress Note     Encounter Date: 6/7/2022      Assessment and Plan:    1. Annual physical exam  - CBC WITH AUTOMATED DIFF; Future  - HEMOGLOBIN A1C WITH EAG; Future  - METABOLIC PANEL, BASIC; Future  - LIPID PANEL; Future  - HEPATIC FUNCTION PANEL; Future  - INSULIN; Future  - CRP, HIGH SENSITIVITY; Future    2. Obesity, morbid ([facility] [address]  Diet and weight loss discussed  Continue NOOM  Try weight loss for elevated BP. If persists may need rx. Continue CPAP  - INSULIN; Future      Diet, exercise and safety discussed with patient. Diagnoses and plans were discussed with the patient. After visit summary given to the patient as well. Follow-up and Dispositions    · Return in about 1 year (around 6/7/2023), or weight follow up., for Annual exam, Medication follow up. Benigno Colin MD        Gabriel Up is a 31 y.o. male who had concerns including Annual Wellness Visit. Now living in [place] [address] physical and lab    Weight  Doing NOOM. Has gotten down as low as 323. Notes stagnation in weight loss. Using CPAP    Health Maintenance  Immunizations:   Influenza: declined  Tetanus: unknown   COVID vaccine J&J X 2      Cancer screening:    Reviewed testicular, prostate, and colon cancer screening guidelines. Smoking: Non smoker      ETOH: modest use-less than 2 drinks per night/once a week    Drugs: Never user    Sexual history: Monogamous heterosexual    Eye exam and Glaucoma Screening: up to date    Last dental exam: sees regularly      The following sections were reviewed & updated as appropriate: PMH, PSH, FH, and SH. Patient Active Problem List   Diagnosis Code    Obesity, morbid ([facility] [address] E66.01          Prior to Admission medications    Medication Sig Start Date End Date Taking? Authorizing Provider   Cetirizine (ZyrTEC) 10 mg cap Take 10 mg by mouth once.     Provider, Historical          Allergies Allergen Reactions    Gluten Diarrhea     Also, intestinal cramps    Morphine Itching            Review of Systems   Constitutional: Positive for weight loss. Respiratory: Negative for shortness of breath. Cardiovascular: Negative for chest pain. Psychiatric/Behavioral: Negative. Smoker:   Social History     Tobacco Use   Smoking Status Former Smoker    Quit date: 10/2006    Years since quitting: 15.6   Smokeless Tobacco Never Used     ETOH:   Social History     Substance and Sexual Activity   Alcohol Use Yes    Comment: less than one per week       FH:    Family History   Problem Relation Age of Onset    Elevated Lipids Mother     Hypertension Mother     Elevated Lipids Father     Heart Disease Father     Diabetes Maternal Aunt     Diabetes Maternal Uncle            Physical Exam:    Visit Vitals  BP (!) 145/88 (BP 1 Location: Right arm, BP Patient Position: Sitting, BP Cuff Size: Large adult)   Pulse 78   Temp 98.2 °F (36.8 °C) (Temporal)   Resp 18   Ht 5' 11\" (1.803 m)   Wt 331 lb (150.1 kg)   SpO2 98%   BMI 46.17 kg/m²     Physical Exam  Physical Examination: General appearance - alert, well appearing, and in no distress, oriented to person, place, and time and appearing overweight  Mental status -  normal mood, behavior, speech, dress, motor activity, and thought processes, no expressed suicidal or homicidal ideation, no hallucinations  Ears - bilateral TM's and external ear canals normal  Nose - normal and patent, no erythema, discharge or polyps  Mouth - mucous membranes moist, pharynx normal without lesions  Neck - supple, no significant adenopathy  Chest - normal chest excursion, normal inspiratory and expiratory function. Clear to ausculation bilaterally.     Heart - normal rate, regular rhythm, normal S1, S2, no murmurs, rubs, clicks or gallops, no extremity edema  Abdomen- benign, no organomegaly or masses  -normal penis and testicles  Skin-no rashes or suspicious moles  Neuro normal speech, moves all extremities, normal gait  Musculoskeletal- grossly normal joint and motor function.

## 2022-06-07 NOTE — PROGRESS NOTES
Chief Complaint   Patient presents with   Shashi Laurent Annual Wellness Visit     1. Have you been to the ER, urgent care clinic since your last visit? [facility]  Hospitalized since your last visit? No       2. Have you seen or consulted any other health care providers outside of the [address] [place] [address] since your last visit? Include any pap smears or colon screening.  No

## 2022-06-07 NOTE — PATIENT INSTRUCTIONS
The essentials of losing weight and a diabetic lower carbohydrate diet. 1. Exercise  a. You should exercise 45- 60 minutes every day. b. This reduces the stored energy in your muscles and allows your insulin level to fall.  c. You can only lose weight when your insulin level is low. Then you burn stored energy. 2.  Fasting   a. You should fast every night from 12-14 hours. b.  Tessy Watson are still using energy at night when you are sleeping and will burn stored energy. c.  Fasting allows you burn stored energy in your internal organs such as the liver. This allows the insulin level to drop.   d.  This allows you to start losing weight. 3.  No sugar!   a. You should try to eliminate sugar from your diet. b.  First, eliminate sweet drinks including:  sodas and uriel, sports drinks (like Gatorade or Poweraid), sweet tea and lemonade, wine (and beer), fruit juice (contains fruit sugar) and milk (contains milk sugar). c.  Eliminate sugary cereals, cookies and candies. No donuts, pastries or coffee cake. d.  Eat desserts only on special occasions:  family reunions, birthdays, anniversaries, major holidays. Eat only small desserts!   e. Start watching labels for foods that have added sugars. 4. Limit starches   a. Limit starches particularly:  bread, noodles, pasta, crackers and chips, rice, potatoes and fries. b.  Watch out for starchy vegetables:  corn and peas. c.  Women can have 30-45 grams of carbs per meal   d. Men can have 45-60 grams of carbs per meal   e. One piece of bread has 15-20 grams of carbs   f. One half cup of oatmeal, corn, rice, peas and cooked pasta have about 15 grams of carbs. 5.  Fruit-special case   a. Fruit has nutrients we need such as Vitamin C but also contains a lot of fruit sugar (fructose)   b. Fruit is not a good snack because fructose does not suppress your appetite.    c.  Fruit can cause progression of fatty liver disease which makes weight loss harder   d. Limit yourself to one serving of fruit per day and try to eat berries, small apples, oranges, itz or grapefruit.           e.  Avoid high sugar fruits including mangos, bananas, grapes and cherries. f.  One serving of fruit is 1/2 to 3/4 of a cup.    6.  What do I eat instead?   a. Eat protein. It curbs your appetite longer than carbs do anyway. Eat meat, chicken, fish and eggs. b.  Eat healthy fats:  fish, olive oil, nuts   c. Eat more vegetables and salads. d.  Eat all of the above before you eat any carbs.   e.  Eat slowly and enjoy your food. f.  Drink more water. 7.  Snacks   a. Good snacks:  Cheese, nuts, Kind bars (minis), Protein bars, carrot sticks, celery sticks. b.  Read labels and look for snacks with low amounts of carbohydrate per serving (10 or less)   c. Try not to eat between meals. You'll lose more weight if you are not constantly putting energy into the system. 8.  Accountability   a. You have to keep yourself honest about your diet efforts. You need reminders to stick to your change in lifestyle and diet. b.  Weigh yourself daily   c.  Record your food intake either on an rachael or in writing.   d.  Record your exercise. 9.  Support and emotional health. a.  Surround yourself with people that will help you and support your efforts. b. Avoid people that may sabotage your efforts or insist that they at least not undermine you.  c.  Be patient. An average patient loses only 3 pounds a month but that's 36 pounds at the end of a year. d.  Think long term. Try to keep up good exercise and diet habits. Once you get the weight off, keep it off.  e.  Pay attention to your emotions about food and your weight. Have a healthy attitude towards yourself and your body. 10.  When do I get to go back to eating the way I did before? Answer: Never. If you resume your old patterns of eating that caused your weight gain, you'll just gain the weight back. Try to make permanent changes in how you live every day. It's not easy but you can do it.

## 2022-08-23 LAB
ALBUMIN SERPL-MCNC: 4.6 G/DL (ref 4–5)
ALP SERPL-CCNC: 71 IU/L (ref 44–121)
ALT SERPL-CCNC: 44 IU/L (ref 0–44)
AST SERPL-CCNC: 27 IU/L (ref 0–40)
BASOPHILS # BLD AUTO: 0 X10E3/UL (ref 0–0.2)
BASOPHILS NFR BLD AUTO: 1 %
BILIRUB DIRECT SERPL-MCNC: 0.13 MG/DL (ref 0–0.4)
BILIRUB SERPL-MCNC: 0.4 MG/DL (ref 0–1.2)
BUN SERPL-MCNC: 13 MG/DL (ref 6–20)
BUN/CREAT SERPL: 11 (ref 9–20)
CALCIUM SERPL-MCNC: 9.2 MG/DL (ref 8.7–10.2)
CHLORIDE SERPL-SCNC: 104 MMOL/L (ref 96–106)
CHOLEST SERPL-MCNC: 210 MG/DL (ref 100–199)
CO2 SERPL-SCNC: 24 MMOL/L (ref 20–29)
CREAT SERPL-MCNC: 1.15 MG/DL (ref 0.76–1.27)
CRP SERPL HS-MCNC: 2.52 MG/L (ref 0–3)
EGFR: 86 ML/MIN/1.73
EOSINOPHIL # BLD AUTO: 0.1 X10E3/UL (ref 0–0.4)
EOSINOPHIL NFR BLD AUTO: 1 %
ERYTHROCYTE [DISTWIDTH] IN BLOOD BY AUTOMATED COUNT: 13.8 % (ref 11.6–15.4)
EST. AVERAGE GLUCOSE BLD GHB EST-MCNC: 128 MG/DL
GLUCOSE SERPL-MCNC: 103 MG/DL (ref 65–99)
HBA1C MFR BLD: 6.1 % (ref 4.8–5.6)
HCT VFR BLD AUTO: 47.1 % (ref 37.5–51)
HDLC SERPL-MCNC: 43 MG/DL
HGB BLD-MCNC: 15.4 G/DL (ref 13–17.7)
IMM GRANULOCYTES # BLD AUTO: 0 X10E3/UL (ref 0–0.1)
IMM GRANULOCYTES NFR BLD AUTO: 0 %
INSULIN SERPL-ACNC: 37.5 UIU/ML (ref 2.6–24.9)
LDLC SERPL CALC-MCNC: 131 MG/DL (ref 0–99)
LYMPHOCYTES # BLD AUTO: 2.9 X10E3/UL (ref 0.7–3.1)
LYMPHOCYTES NFR BLD AUTO: 37 %
MCH RBC QN AUTO: 27.7 PG (ref 26.6–33)
MCHC RBC AUTO-ENTMCNC: 32.7 G/DL (ref 31.5–35.7)
MCV RBC AUTO: 85 FL (ref 79–97)
MONOCYTES # BLD AUTO: 0.7 X10E3/UL (ref 0.1–0.9)
MONOCYTES NFR BLD AUTO: 9 %
NEUTROPHILS # BLD AUTO: 4.2 X10E3/UL (ref 1.4–7)
NEUTROPHILS NFR BLD AUTO: 52 %
PLATELET # BLD AUTO: 274 X10E3/UL (ref 150–450)
POTASSIUM SERPL-SCNC: 4.2 MMOL/L (ref 3.5–5.2)
PROT SERPL-MCNC: 6.8 G/DL (ref 6–8.5)
RBC # BLD AUTO: 5.55 X10E6/UL (ref 4.14–5.8)
SODIUM SERPL-SCNC: 141 MMOL/L (ref 134–144)
TRIGL SERPL-MCNC: 200 MG/DL (ref 0–149)
VLDLC SERPL CALC-MCNC: 36 MG/DL (ref 5–40)
WBC # BLD AUTO: 7.9 X10E3/UL (ref 3.4–10.8)

## 2023-05-23 RX ORDER — CETIRIZINE HYDROCHLORIDE 10 MG/1
10 CAPSULE, LIQUID FILLED ORAL ONCE
COMMUNITY

## 2024-02-13 ENCOUNTER — TELEPHONE (OUTPATIENT)
Age: 39
End: 2024-02-13

## 2024-02-13 NOTE — TELEPHONE ENCOUNTER
LVM for patient to call and schedule new patient appointment with Dr Daniel. Please offer either location. Thanks.

## 2024-03-01 ENCOUNTER — OFFICE VISIT (OUTPATIENT)
Age: 39
End: 2024-03-01
Payer: COMMERCIAL

## 2024-03-01 VITALS
HEART RATE: 83 BPM | SYSTOLIC BLOOD PRESSURE: 144 MMHG | DIASTOLIC BLOOD PRESSURE: 88 MMHG | BODY MASS INDEX: 44.1 KG/M2 | OXYGEN SATURATION: 97 % | HEIGHT: 71 IN | WEIGHT: 315 LBS

## 2024-03-01 DIAGNOSIS — E78.00 ELEVATED CHOLESTEROL: ICD-10-CM

## 2024-03-01 DIAGNOSIS — E66.01 OBESITY, MORBID (HCC): Primary | ICD-10-CM

## 2024-03-01 DIAGNOSIS — R93.1 ELEVATED CORONARY ARTERY CALCIUM SCORE: ICD-10-CM

## 2024-03-01 PROCEDURE — 99204 OFFICE O/P NEW MOD 45 MIN: CPT | Performed by: INTERNAL MEDICINE

## 2024-03-01 PROCEDURE — 93000 ELECTROCARDIOGRAM COMPLETE: CPT | Performed by: INTERNAL MEDICINE

## 2024-03-01 RX ORDER — BUPROPION HYDROCHLORIDE 150 MG/1
150 TABLET, EXTENDED RELEASE ORAL DAILY
COMMUNITY
Start: 2024-02-14

## 2024-03-01 ASSESSMENT — PATIENT HEALTH QUESTIONNAIRE - PHQ9
SUM OF ALL RESPONSES TO PHQ9 QUESTIONS 1 & 2: 0
SUM OF ALL RESPONSES TO PHQ QUESTIONS 1-9: 0
SUM OF ALL RESPONSES TO PHQ QUESTIONS 1-9: 0
1. LITTLE INTEREST OR PLEASURE IN DOING THINGS: 0
SUM OF ALL RESPONSES TO PHQ QUESTIONS 1-9: 0
SUM OF ALL RESPONSES TO PHQ QUESTIONS 1-9: 0
2. FEELING DOWN, DEPRESSED OR HOPELESS: 0

## 2024-03-01 NOTE — PROGRESS NOTES
Patient: Abdullahi Pedro  : 1985    Primary Cardiologist: Shefali Daniel MD  EP Cardiologist:  PCP: Ki Carrillo MD    Today's Date: 3/1/2024      ASSESSMENT AND PLAN:     Assessment and Plan:  Elevated CCS  Coronary CTA  Lp (a)  Genetic testing FH  Advised statin, patient and mother defer - mom advocated homeopathic, patient had abnormal LFTS with ACL surgery    2.  Factor VIII deficiency    3.  HLD  , repeat 65 after lifestyle changes  Lp (a)  Genetic testing FH  Advised statin, patient and mother defer - mom advocated homeopathic, patient had abnormal LFTS with ACL surgery    3. Obesity  Counseled on weight loss    4.  Vaping  Cessation    FU 3 months.         ICD-10-CM    1. Obesity, morbid (HCC)  E66.01 EKG 12 Lead      2. Elevated cholesterol  E78.00 EKG 12 Lead          HISTORY OF PRESENT ILLNESS:     History of Present Illness:  Abdullahi Pedro is a 38 y.o. male referred for elevated CCS.    Went to doctor 2023 - blood tests - HLD.    CCS 24 Centra .7 (340 LAD, 148 RCA, Lcx 28)    Labs dated 2023 , , HDL 35.  LDL reduced to 65 with diet changes rerxiercse.  A1C 6.0.    Taking OTC supplements - not cealr - seems to have red yeast rice.  Dietary changes - red meat 5-7 days a week, butter, fried, processed meats. .  Now salads miso dressing.  Tuna, chicken lean protein.  Water, activity. Lifts, volleyball.  Scenile, cumin, read yeast rice.      FH PGF CVA, PGM MI, MGM MI, MGF abdominal aneurysm (tobacco).  Dad HLD, genetic he states, ICD.  Afib.  HCOM.    He has been tested for HCOM, told negative.      Tobacco a year, smokeless tobacco, dipping.  Vapes.  Nicotine.        EKG NSR.    Denies chest pain, edema, syncope, shortness of breath at rest, dyspnea on exertion, PND or orthopnea.  Has no tachycardia, palpitations or sense of arrhythmia.     PAST MEDICAL HISTORY:     Past Medical History:   Diagnosis Date    Clotting disorder (HCC)     Sleep apnea

## 2024-03-05 LAB — LPA SERPL-SCNC: 84 NMOL/L

## 2024-03-06 NOTE — RESULT ENCOUNTER NOTE
Abdullahi,    Your lipoprotein (a) was elevated.  This confers a much higher risk of CV events.  I would like you to be on either a statin or PCSK9 inhibitor.    I will send an rx over for rosuvastatin to initiate some form of treatment, but we can discuss more at FU.    Dr. Daniel

## 2024-04-09 ENCOUNTER — TELEPHONE (OUTPATIENT)
Age: 39
End: 2024-04-09

## 2024-04-09 NOTE — TELEPHONE ENCOUNTER
Spoke with patient after ID x2 and conveyed CTA premed and sent to [facility] pharmacy.   [facility] results were negative and discussed with patient.

## 2024-04-09 NOTE — TELEPHONE ENCOUNTER
Patient states he has a CT scan on 04/16/2024 and Dr is supposed to be sending in a prescription to his pharmacy a few days before but he hasn't heard anything yet. He also stated he had done a saliva tube test and sent it backbut he hasn't heard about the results yet and would like to follow up on that as well.    [facility] # [phone]    Patient Phone [phone]

## 2024-04-16 ENCOUNTER — HOSPITAL ENCOUNTER (OUTPATIENT)
Facility: HOSPITAL | Age: 39
Discharge: HOME | End: 2024-04-19
Attending: INTERNAL MEDICINE
Payer: COMMERCIAL

## 2024-04-16 VITALS
RESPIRATION RATE: 15 BRPM | SYSTOLIC BLOOD PRESSURE: 148 MMHG | HEART RATE: 77 BPM | DIASTOLIC BLOOD PRESSURE: 90 MMHG | OXYGEN SATURATION: 99 %

## 2024-04-16 DIAGNOSIS — E78.00 ELEVATED CHOLESTEROL: ICD-10-CM

## 2024-04-16 DIAGNOSIS — R93.1 ELEVATED CORONARY ARTERY CALCIUM SCORE: ICD-10-CM

## 2024-04-16 DIAGNOSIS — E66.01 OBESITY, MORBID (HCC): ICD-10-CM

## 2024-04-16 PROCEDURE — 6360000004 HC RX CONTRAST MEDICATION: Performed by: INTERNAL MEDICINE

## 2024-04-16 PROCEDURE — 75574 CT ANGIO HRT W/3D IMAGE: CPT

## 2024-04-16 PROCEDURE — 2500000003 HC RX 250 WO HCPCS: Performed by: INTERNAL MEDICINE

## 2024-04-16 RX ORDER — METOPROLOL TARTRATE 1 MG/ML
5 INJECTION, SOLUTION INTRAVENOUS EVERY 5 MIN PRN
Status: COMPLETED | OUTPATIENT
Start: 2024-04-16 | End: 2024-04-16

## 2024-04-16 RX ADMIN — METOPROLOL TARTRATE 5 MG: 5 INJECTION INTRAVENOUS at 12:43

## 2024-04-16 RX ADMIN — METOPROLOL TARTRATE 5 MG: 5 INJECTION INTRAVENOUS at 12:30

## 2024-04-16 RX ADMIN — IOPAMIDOL 100 ML: 755 INJECTION, SOLUTION INTRAVENOUS at 13:04

## 2024-04-16 RX ADMIN — METOPROLOL TARTRATE 5 MG: 5 INJECTION INTRAVENOUS at 12:47

## 2024-04-16 RX ADMIN — METOPROLOL TARTRATE 5 MG: 5 INJECTION INTRAVENOUS at 12:37

## 2024-04-25 ENCOUNTER — PATIENT MESSAGE (OUTPATIENT)
Age: 39
End: 2024-04-25

## 2024-04-25 NOTE — TELEPHONE ENCOUNTER
Spoke with patient after ID x2 to discuss Dr. Daniel's response message this morning. Patient would like to talk it over with his family and requests for us to wait for a return call from him prior to anything further.

## 2024-06-21 ENCOUNTER — OFFICE VISIT (OUTPATIENT)
Age: 39
End: 2024-06-21
Payer: COMMERCIAL

## 2024-06-21 VITALS
OXYGEN SATURATION: 94 % | DIASTOLIC BLOOD PRESSURE: 88 MMHG | SYSTOLIC BLOOD PRESSURE: 144 MMHG | HEIGHT: 71 IN | BODY MASS INDEX: 44.1 KG/M2 | HEART RATE: 99 BPM | WEIGHT: 315 LBS

## 2024-06-21 DIAGNOSIS — R93.1 ELEVATED CORONARY ARTERY CALCIUM SCORE: ICD-10-CM

## 2024-06-21 DIAGNOSIS — E78.41 ELEVATED LP(A): ICD-10-CM

## 2024-06-21 DIAGNOSIS — E78.00 ELEVATED CHOLESTEROL: ICD-10-CM

## 2024-06-21 DIAGNOSIS — E66.01 OBESITY, MORBID (HCC): Primary | ICD-10-CM

## 2024-06-21 PROCEDURE — 99214 OFFICE O/P EST MOD 30 MIN: CPT | Performed by: INTERNAL MEDICINE

## 2024-06-21 RX ORDER — ASPIRIN 81 MG/1
81 TABLET ORAL DAILY
COMMUNITY

## 2024-06-21 RX ORDER — METOPROLOL SUCCINATE 25 MG/1
25 TABLET, EXTENDED RELEASE ORAL DAILY
Qty: 90 TABLET | Refills: 3 | Status: SHIPPED | OUTPATIENT
Start: 2024-06-21

## 2024-06-21 ASSESSMENT — PATIENT HEALTH QUESTIONNAIRE - PHQ9
SUM OF ALL RESPONSES TO PHQ QUESTIONS 1-9: 0
SUM OF ALL RESPONSES TO PHQ9 QUESTIONS 1 & 2: 0
SUM OF ALL RESPONSES TO PHQ QUESTIONS 1-9: 0
SUM OF ALL RESPONSES TO PHQ QUESTIONS 1-9: 0
1. LITTLE INTEREST OR PLEASURE IN DOING THINGS: NOT AT ALL
SUM OF ALL RESPONSES TO PHQ QUESTIONS 1-9: 0
2. FEELING DOWN, DEPRESSED OR HOPELESS: NOT AT ALL

## 2024-06-21 NOTE — PROGRESS NOTES
Patient: Abdullahi Pedro  : 1985    Primary Cardiologist: Shefali Daniel MD  EP Cardiologist:  PCP: Ki Carrillo MD    Today's Date: 2024      ASSESSMENT AND PLAN:     Assessment and Plan:  Elevated   Coronary CTA 3/2024: The mid LAD demonstrate  calcified plaque with possibility of a greater than 50% luminal stenosis as the  lumen is not well visualized due to calcification.  Lp (a) elevated @ 84  Genetic testing FH negative [facility]  Advised statin, patient and mother defer - mom advocated homeopathic, patient had abnormal LFTS with ACL surgery  Start toprol 25, plan to meet back in 4 months, will try and repeat CT scan after BB initiation in 6 months.     2.  Factor VIII deficiency    3.  HLD  , repeat 65 after lifestyle changes, mostly recent 24 LDL 73.    Lp (a)  Genetic testing FH  Advised statin, patient and mother defer - mom advocated homeopathic, patient had abnormal LFTS with ACL surgery  Counseled on PCSK9 - education given    3. Obesity  Counseled on weight loss    4.  Vaping  Cessation    Plaque X chelation therapy - IV 2 weeks Dr. Fleischer via [facility] chest Port.  Dr. Fleischer [facility]. Post placed Chacha Ibanez.      FU 3-4 months.         ICD-10-CM    1. Obesity, morbid (HCC)  E66.01       2. Elevated Lp(a)  E78.41       3. Elevated cholesterol  E78.00       4. Elevated coronary artery calcium score  R93.1           HISTORY OF PRESENT ILLNESS:     History of Present Illness:  Abdullahi Pedro is a 38 y.o. male referred for elevated CCS.    Went to doctor 2023 - blood tests - HLD.    CCS 24 Centra .7 (340 LAD, 148 RCA, Lcx 28)    Labs dated 2023 , , HDL 35.  LDL reduced to 65 with diet changes rerxiercse.  A1C 6.0.    Taking OTC supplements - not cealr - seems to have red yeast rice.  Dietary changes - red meat 5-7 days a week, butter, fried, processed meats. .  Now salads miso dressing.  Tuna,

## 2024-11-12 NOTE — PROGRESS NOTES
Patient: Abdullahi Pedro  : 1985    Primary Cardiologist: Shefali Daniel MD   EP Cardiologist:  PCP: Ki Carrillo MD    Today's Date: 2024      ASSESSMENT AND PLAN:     Assessment and Plan:  Elevated   Coronary CTA 3/2024: The mid LAD demonstrate  calcified plaque with possibility of a greater than 50% luminal stenosis as the  lumen is not well visualized due to calcification.  Lp (a) elevated @ 84  Genetic testing FH negative [facility]  Advised statin, patient and mother defer - mom advocated homeopathic, patient had abnormal LFTS with ACL surgery  Continue toprol 25 mg daily, will recheck NMR lipid panel   Discussed option for cardiac catheterization since FFR was not able to be performed on cCTA - he defers at this time - offered to recheck cCTA and will plan to      2.  Factor VIII deficiency    3.  HLD  , repeat 65 after lifestyle changes, mostly recent 24 LDL 73.    Lp (a)  Genetic testing FH  Advised statin, patient and mother defer - mom advocated homeopathic, patient had abnormal LFTS with ACL surgery  Counseled on PCSK9 - education given  Will recheck NMR lipid panel - he had significant reduction in LDL with diet and lifestyle changes     3. Obesity  Counseled on weight loss    4.  Vaping  Cessation    Plaque X chelation therapy - IV 2 weeks Dr. Fleischer via [facility] chest Port.  Dr. Fleischer [facility]. Post placed Chacha Ibanez.      FU 6 months.       ICD-10-CM    1. Elevated coronary artery calcium score  R93.1 Lipoprotein NMR      2. Elevated Lp(a)  E78.41       3. Elevated cholesterol  E78.00       4. BMI 40.0-44.9, adult  Z68.41             HISTORY OF PRESENT ILLNESS:     History of Present Illness:  Abdullahi Pedro is a 39 y.o. male presents today for follow-up.   Denies chest pain, shortness of breath, edema, palpitations. Says resting heart rate is in 60s typically.   Exercises 3-5 days per day week without

## 2024-11-18 ENCOUNTER — OFFICE VISIT (OUTPATIENT)
Age: 39
End: 2024-11-18
Payer: COMMERCIAL

## 2024-11-18 VITALS
BODY MASS INDEX: 43.26 KG/M2 | HEIGHT: 71 IN | OXYGEN SATURATION: 94 % | HEART RATE: 83 BPM | WEIGHT: 309 LBS | DIASTOLIC BLOOD PRESSURE: 80 MMHG | SYSTOLIC BLOOD PRESSURE: 136 MMHG

## 2024-11-18 DIAGNOSIS — R93.1 ELEVATED CORONARY ARTERY CALCIUM SCORE: Primary | ICD-10-CM

## 2024-11-18 DIAGNOSIS — E78.00 ELEVATED CHOLESTEROL: ICD-10-CM

## 2024-11-18 DIAGNOSIS — E78.41 ELEVATED LP(A): ICD-10-CM

## 2024-11-18 PROCEDURE — 99214 OFFICE O/P EST MOD 30 MIN: CPT

## 2024-11-18 NOTE — PROGRESS NOTES
Chief Complaint   Patient presents with    Other     CALCIUM SCORE,     Cholesterol Problem     Vitals:    11/18/24 0913   BP: 136/80   Site: Left Upper Arm   Position: Sitting   Cuff Size: Medium Adult   Pulse: 83   SpO2: 94%   Weight: (!) 140.2 kg (309 lb)   Height: 1.803 m (5' 11\")      /80 (Site: Left Upper Arm, Position: Sitting, Cuff Size: Medium Adult)   Pulse 83   Ht 1.803 m (5' 11\")   Wt (!) 140.2 kg (309 lb)   SpO2 94%   BMI 43.10 kg/m²

## 2025-06-02 ENCOUNTER — OFFICE VISIT (OUTPATIENT)
Age: 40
End: 2025-06-02
Payer: COMMERCIAL

## 2025-06-02 VITALS
HEART RATE: 83 BPM | WEIGHT: 313 LBS | SYSTOLIC BLOOD PRESSURE: 134 MMHG | DIASTOLIC BLOOD PRESSURE: 84 MMHG | OXYGEN SATURATION: 96 % | BODY MASS INDEX: 43.82 KG/M2 | HEIGHT: 71 IN

## 2025-06-02 DIAGNOSIS — E66.01 OBESITY, MORBID (HCC): ICD-10-CM

## 2025-06-02 DIAGNOSIS — E78.41 ELEVATED LP(A): ICD-10-CM

## 2025-06-02 DIAGNOSIS — R93.1 ELEVATED CORONARY ARTERY CALCIUM SCORE: Primary | ICD-10-CM

## 2025-06-02 DIAGNOSIS — E78.00 ELEVATED CHOLESTEROL: ICD-10-CM

## 2025-06-02 LAB
ALBUMIN SERPL-MCNC: 4.1 G/DL (ref 3.5–5)
ALBUMIN/GLOB SERPL: 1.3 (ref 1.1–2.2)
ALP SERPL-CCNC: 65 U/L (ref 45–117)
ALT SERPL-CCNC: 49 U/L (ref 12–78)
ANION GAP SERPL CALC-SCNC: 9 MMOL/L (ref 2–12)
AST SERPL-CCNC: 30 U/L (ref 15–37)
BILIRUB SERPL-MCNC: 0.5 MG/DL (ref 0.2–1)
BUN SERPL-MCNC: 13 MG/DL (ref 6–20)
BUN/CREAT SERPL: 13 (ref 12–20)
CALCIUM SERPL-MCNC: 9.4 MG/DL (ref 8.5–10.1)
CHLORIDE SERPL-SCNC: 104 MMOL/L (ref 97–108)
CHOLEST SERPL-MCNC: 193 MG/DL
CO2 SERPL-SCNC: 25 MMOL/L (ref 21–32)
CREAT SERPL-MCNC: 1.03 MG/DL (ref 0.7–1.3)
ERYTHROCYTE [DISTWIDTH] IN BLOOD BY AUTOMATED COUNT: 13.6 % (ref 11.5–14.5)
GLOBULIN SER CALC-MCNC: 3.1 G/DL (ref 2–4)
GLUCOSE SERPL-MCNC: 95 MG/DL (ref 65–100)
HCT VFR BLD AUTO: 50.1 % (ref 36.6–50.3)
HDLC SERPL-MCNC: 47 MG/DL
HDLC SERPL: 4.1 (ref 0–5)
HGB BLD-MCNC: 16 G/DL (ref 12.1–17)
LDLC SERPL CALC-MCNC: 124.6 MG/DL (ref 0–100)
MCH RBC QN AUTO: 27.6 PG (ref 26–34)
MCHC RBC AUTO-ENTMCNC: 31.9 G/DL (ref 30–36.5)
MCV RBC AUTO: 86.5 FL (ref 80–99)
NRBC # BLD: 0 K/UL (ref 0–0.01)
NRBC BLD-RTO: 0 PER 100 WBC
PLATELET # BLD AUTO: 281 K/UL (ref 150–400)
PMV BLD AUTO: 9.8 FL (ref 8.9–12.9)
POTASSIUM SERPL-SCNC: 4.3 MMOL/L (ref 3.5–5.1)
PROT SERPL-MCNC: 7.2 G/DL (ref 6.4–8.2)
RBC # BLD AUTO: 5.79 M/UL (ref 4.1–5.7)
SODIUM SERPL-SCNC: 138 MMOL/L (ref 136–145)
TRIGL SERPL-MCNC: 107 MG/DL
TSH SERPL DL<=0.05 MIU/L-ACNC: 2.25 UIU/ML (ref 0.36–3.74)
VLDLC SERPL CALC-MCNC: 21.4 MG/DL
WBC # BLD AUTO: 6.9 K/UL (ref 4.1–11.1)

## 2025-06-02 PROCEDURE — 93000 ELECTROCARDIOGRAM COMPLETE: CPT | Performed by: INTERNAL MEDICINE

## 2025-06-02 PROCEDURE — 99214 OFFICE O/P EST MOD 30 MIN: CPT | Performed by: INTERNAL MEDICINE

## 2025-06-02 NOTE — PROGRESS NOTES
Patient: Abdullahi Pedro  : 1985    Primary Cardiologist: Shefali Daniel MD   EP Cardiologist:  PCP: Ki Carrillo MD    Today's Date: 2025    Routine FU.  Feels well.    ASSESSMENT AND PLAN:     Assessment and Plan:  Elevated   Coronary CTA 3/2024: The mid LAD demonstrate  calcified plaque with possibility of a greater than 50% luminal stenosis as the  lumen is not well visualized due to calcification.  Lp (a) elevated @ 84  Genetic testing FH negative [facility]  Advised statin, patient and mother defer - mom advocated homeopathic, patient had abnormal LFTS with ACL surgery  Continue toprol 25 mg daily, will recheck NMR lipid panel   Discussed option for cardiac catheterization since FFR was not able to be performed on cCTA - he defers at this time  Will repeat CT if sympotms warrant'  Advocated for traditional statins or PCSK9 - offered book OUTLIVE for him to read    2.  Factor VIII deficiency    3.  HLD  , repeat 65 after lifestyle changes, mostly recent 24 LDL 73.    Repeat labs  Lp (a)  Genetic testing FH  Advised statin, patient and mother defer - mom advocated homeopathic, patient had abnormal LFTS with ACL surgery  Counseled on PCSK9 - education given  Will recheck NMR lipid panel - he had significant reduction in LDL with diet and lifestyle changes     3. Obesity  Counseled on weight loss    4.  Vaping  Cessation    Plaque X chelation therapy - IV 2 weeks Dr. Fleischer via [facility] chest Port.  Dr. Fleischer [facility]. Post placed Chacha Ibanez.      FU 12 months.       ICD-10-CM    1. Elevated coronary artery calcium score  R93.1 EKG 12 Lead     Lipid Panel     TSH     CBC     Comprehensive Metabolic Panel      2. Elevated Lp(a)  E78.41       3. Obesity, morbid (HCC)  E66.01       4. Elevated cholesterol  E78.00             HISTORY OF PRESENT ILLNESS:     History of Present Illness:  Abdullahi Pedro is a 39 y.o. male presents today

## 2025-06-02 NOTE — PROGRESS NOTES
Chief Complaint   Patient presents with    Follow-up    Other     HIGH CAC     Vitals:    06/02/25 1026 06/02/25 1037   BP: (!) 138/100 134/84   BP Site: Left Upper Arm Left Upper Arm   Patient Position: Sitting Sitting   BP Cuff Size: Large Adult Large Adult   Pulse: 83    SpO2: 96%    Weight: (!) 142 kg (313 lb)    Height: 1.803 m (5' 11\")       /84 (BP Site: Left Upper Arm, Patient Position: Sitting, BP Cuff Size: Large Adult)   Pulse 83   Ht 1.803 m (5' 11\")   Wt (!) 142 kg (313 lb)   SpO2 96%   BMI 43.65 kg/m²

## 2025-06-06 ENCOUNTER — RESULTS FOLLOW-UP (OUTPATIENT)
Age: 40
End: 2025-06-06

## 2025-06-06 NOTE — RESULT ENCOUNTER NOTE
Giuliano Fernando,    Your labs overall look good.  Your LDL is 124 which is much improved but ideally we want this less than 70.  Your blood counts, electrolytes and thyroid function was normal.    Dr. Daniel

## 2025-07-18 ENCOUNTER — TELEPHONE (OUTPATIENT)
Age: 40
End: 2025-07-18

## 2025-07-18 NOTE — TELEPHONE ENCOUNTER
Teena called from [facility] in reference to patient  they wanted to speak to a nurse inreference to patient treatment. She stated that patient should be seen in a yearly basis and it is time sensitive.  Thank you    CB#  [phone]

## 2025-07-18 NOTE — TELEPHONE ENCOUNTER
07/18/25 12:10 PM Return call placed to patient. Verified patient ID x [id number]. Teena states she was reaching out to clarify what condition patient saw Dr. Colbert for. She states that they have within records that patient has hemophilia and requires yearly follow up. Per chart review, advised Dr. Colbert saw patient in 04/2020 for von Willebrand Disease and was advised to follow up on an as needed basis. Teena voiced understanding and states she will update this in their records. No further questions or concerns at this time.

## 2025-08-14 ENCOUNTER — TRANSCRIBE ORDERS (OUTPATIENT)
Facility: HOSPITAL | Age: 40
End: 2025-08-14

## 2025-08-14 DIAGNOSIS — S46.212A RUPTURE OF BICEPS TENDON, LEFT, INITIAL ENCOUNTER: Primary | ICD-10-CM

## 2025-08-16 DIAGNOSIS — R93.1 ELEVATED CORONARY ARTERY CALCIUM SCORE: ICD-10-CM

## 2025-08-21 RX ORDER — METOPROLOL SUCCINATE 25 MG/1
25 TABLET, EXTENDED RELEASE ORAL DAILY
Qty: 90 TABLET | Refills: 3 | Status: SHIPPED | OUTPATIENT
Start: 2025-08-21

## 2025-08-22 ENCOUNTER — HOSPITAL ENCOUNTER (OUTPATIENT)
Facility: HOSPITAL | Age: 40
Discharge: HOME | End: 2025-08-25
Payer: COMMERCIAL

## 2025-08-22 DIAGNOSIS — S46.212A RUPTURE OF BICEPS TENDON, LEFT, INITIAL ENCOUNTER: ICD-10-CM

## 2025-08-22 PROCEDURE — 73221 MRI JOINT UPR EXTREM W/O DYE: CPT
